# Patient Record
Sex: MALE | Race: ASIAN | Employment: FULL TIME | URBAN - METROPOLITAN AREA
[De-identification: names, ages, dates, MRNs, and addresses within clinical notes are randomized per-mention and may not be internally consistent; named-entity substitution may affect disease eponyms.]

---

## 2018-12-11 ENCOUNTER — PATIENT OUTREACH (OUTPATIENT)
Dept: PLASTIC SURGERY | Facility: CLINIC | Age: 33
End: 2018-12-11

## 2018-12-11 DIAGNOSIS — F64.0 GENDER DYSPHORIA IN ADULT: Primary | ICD-10-CM

## 2018-12-11 NOTE — PROGRESS NOTES
[facility]:  Care Coordination Note     SITUATION   Patient is a 33 year old who is receiving support for:  Clinic Care Coordination - Initial  .    BACKGROUND     Pt previous patient of Dr. Cuenca, current goes to [facility] for care. Pt wanting to know other PCP referrals within [place] to possibly switch care.     ASSESSMENT     Surgery              CGC Assessment  Comprehensive Gender Care (CGC) Enrollment: Potential(Pt calling to obtain PCP referrals within [place] (P not in network). )          PLAN       Nursing Interventions:   [facility] program and services discussed with patient. [place] provider Dr. Calderon,  Dr. Logan, and Ramona Fierro referrals provided to patient. [facility] referral placed.     Follow-up plan:  Patient will determine which providers are in network and reach out himself.        Cecilio Oneal

## 2020-03-02 ENCOUNTER — HEALTH MAINTENANCE LETTER (OUTPATIENT)
Age: 35
End: 2020-03-02

## 2020-12-20 ENCOUNTER — HEALTH MAINTENANCE LETTER (OUTPATIENT)
Age: 35
End: 2020-12-20

## 2021-04-24 ENCOUNTER — HEALTH MAINTENANCE LETTER (OUTPATIENT)
Age: 36
End: 2021-04-24

## 2021-10-03 ENCOUNTER — HEALTH MAINTENANCE LETTER (OUTPATIENT)
Age: 36
End: 2021-10-03

## 2021-10-12 ENCOUNTER — HOSPITAL ENCOUNTER (EMERGENCY)
Facility: HOSPITAL | Age: 36
Discharge: HOME | End: 2021-10-12
Attending: EMERGENCY MEDICINE | Admitting: EMERGENCY MEDICINE
Payer: COMMERCIAL

## 2021-10-12 ENCOUNTER — APPOINTMENT (OUTPATIENT)
Dept: RADIOLOGY | Facility: HOSPITAL | Age: 36
End: 2021-10-12
Attending: EMERGENCY MEDICINE
Payer: COMMERCIAL

## 2021-10-12 VITALS
HEIGHT: 60 IN | TEMPERATURE: 99.5 F | RESPIRATION RATE: 16 BRPM | HEART RATE: 85 BPM | OXYGEN SATURATION: 98 % | BODY MASS INDEX: 27.48 KG/M2 | SYSTOLIC BLOOD PRESSURE: 120 MMHG | DIASTOLIC BLOOD PRESSURE: 73 MMHG | WEIGHT: 140 LBS

## 2021-10-12 DIAGNOSIS — R42 LIGHTHEADEDNESS: ICD-10-CM

## 2021-10-12 DIAGNOSIS — R07.9 CHEST PAIN, UNSPECIFIED TYPE: ICD-10-CM

## 2021-10-12 LAB
ALBUMIN SERPL-MCNC: 4.1 G/DL (ref 3.5–5)
ALP SERPL-CCNC: 69 U/L (ref 45–120)
ALT SERPL W P-5'-P-CCNC: 28 U/L (ref 0–45)
ANION GAP SERPL CALCULATED.3IONS-SCNC: 11 MMOL/L (ref 5–18)
AST SERPL W P-5'-P-CCNC: 25 U/L (ref 0–40)
ATRIAL RATE - MUSE: 79 BPM
BASOPHILS # BLD AUTO: 0 10E3/UL (ref 0–0.2)
BASOPHILS NFR BLD AUTO: 1 %
BILIRUB SERPL-MCNC: 0.7 MG/DL (ref 0–1)
BNP SERPL-MCNC: <10 PG/ML (ref 0–35)
BUN SERPL-MCNC: 15 MG/DL (ref 8–22)
CALCIUM SERPL-MCNC: 9.9 MG/DL (ref 8.5–10.5)
CHLORIDE BLD-SCNC: 105 MMOL/L (ref 98–107)
CO2 SERPL-SCNC: 26 MMOL/L (ref 22–31)
CREAT SERPL-MCNC: 0.83 MG/DL (ref 0.7–1.3)
D DIMER PPP FEU-MCNC: 0.3 UG/ML FEU (ref 0–0.5)
DIASTOLIC BLOOD PRESSURE - MUSE: NORMAL MMHG
EOSINOPHIL # BLD AUTO: 0.1 10E3/UL (ref 0–0.7)
EOSINOPHIL NFR BLD AUTO: 2 %
ERYTHROCYTE [DISTWIDTH] IN BLOOD BY AUTOMATED COUNT: 12 % (ref 10–15)
GFR SERPL CREATININE-BSD FRML MDRD: >90 ML/MIN/1.73M2
GLUCOSE BLD-MCNC: 140 MG/DL (ref 70–125)
HCT VFR BLD AUTO: 45 % (ref 40–53)
HGB BLD-MCNC: 15.7 G/DL (ref 13.3–17.7)
HOLD SPECIMEN: NORMAL
IMM GRANULOCYTES # BLD: 0 10E3/UL
IMM GRANULOCYTES NFR BLD: 0 %
INTERPRETATION ECG - MUSE: NORMAL
LYMPHOCYTES # BLD AUTO: 1.1 10E3/UL (ref 0.8–5.3)
LYMPHOCYTES NFR BLD AUTO: 17 %
MAGNESIUM SERPL-MCNC: 1.9 MG/DL (ref 1.8–2.6)
MCH RBC QN AUTO: 33.4 PG (ref 26.5–33)
MCHC RBC AUTO-ENTMCNC: 34.9 G/DL (ref 31.5–36.5)
MCV RBC AUTO: 96 FL (ref 78–100)
MONOCYTES # BLD AUTO: 0.3 10E3/UL (ref 0–1.3)
MONOCYTES NFR BLD AUTO: 4 %
NEUTROPHILS # BLD AUTO: 5.1 10E3/UL (ref 1.6–8.3)
NEUTROPHILS NFR BLD AUTO: 76 %
NRBC # BLD AUTO: 0 10E3/UL
NRBC BLD AUTO-RTO: 0 /100
P AXIS - MUSE: 56 DEGREES
PLATELET # BLD AUTO: 185 10E3/UL (ref 150–450)
POTASSIUM BLD-SCNC: 3.5 MMOL/L (ref 3.5–5)
PR INTERVAL - MUSE: 130 MS
PROT SERPL-MCNC: 7.5 G/DL (ref 6–8)
QRS DURATION - MUSE: 102 MS
QT - MUSE: 372 MS
QTC - MUSE: 426 MS
R AXIS - MUSE: 6 DEGREES
RBC # BLD AUTO: 4.7 10E6/UL (ref 4.4–5.9)
SODIUM SERPL-SCNC: 142 MMOL/L (ref 136–145)
SYSTOLIC BLOOD PRESSURE - MUSE: NORMAL MMHG
T AXIS - MUSE: 53 DEGREES
TROPONIN I SERPL-MCNC: 0.02 NG/ML (ref 0–0.29)
TROPONIN I SERPL-MCNC: <0.01 NG/ML (ref 0–0.29)
VENTRICULAR RATE- MUSE: 79 BPM
WBC # BLD AUTO: 6.6 10E3/UL (ref 4–11)

## 2021-10-12 PROCEDURE — 71046 X-RAY EXAM CHEST 2 VIEWS: CPT

## 2021-10-12 PROCEDURE — 83880 ASSAY OF NATRIURETIC PEPTIDE: CPT | Performed by: EMERGENCY MEDICINE

## 2021-10-12 PROCEDURE — 85379 FIBRIN DEGRADATION QUANT: CPT | Performed by: EMERGENCY MEDICINE

## 2021-10-12 PROCEDURE — 36415 COLL VENOUS BLD VENIPUNCTURE: CPT | Performed by: EMERGENCY MEDICINE

## 2021-10-12 PROCEDURE — 99285 EMERGENCY DEPT VISIT HI MDM: CPT | Mod: 25

## 2021-10-12 PROCEDURE — 93005 ELECTROCARDIOGRAM TRACING: CPT | Performed by: STUDENT IN AN ORGANIZED HEALTH CARE EDUCATION/TRAINING PROGRAM

## 2021-10-12 PROCEDURE — 80053 COMPREHEN METABOLIC PANEL: CPT | Performed by: EMERGENCY MEDICINE

## 2021-10-12 PROCEDURE — 84484 ASSAY OF TROPONIN QUANT: CPT | Performed by: EMERGENCY MEDICINE

## 2021-10-12 PROCEDURE — 83735 ASSAY OF MAGNESIUM: CPT | Performed by: EMERGENCY MEDICINE

## 2021-10-12 PROCEDURE — 93005 ELECTROCARDIOGRAM TRACING: CPT | Performed by: EMERGENCY MEDICINE

## 2021-10-12 PROCEDURE — 85025 COMPLETE CBC W/AUTO DIFF WBC: CPT | Performed by: EMERGENCY MEDICINE

## 2021-10-12 ASSESSMENT — MIFFLIN-ST. JEOR: SCORE: 1417.54

## 2021-10-12 NOTE — ED PROVIDER NOTES
EMERGENCY DEPARTMENT ENCOUnter      NAME: Murphy Cannon  AGE: 36 year old male  YOB: 1985  MRN: [id number]  EVALUATION DATE & TIME: 10/12/2021  6:43 PM    PCP: [facility]    ED PROVIDER: Paras Bryant DO      Chief Complaint   Patient presents with     Chest Pain         FINAL IMPRESSION:  1. Chest pain, unspecified type    2. Lightheadedness          ED COURSE & MEDICAL DECISION MAKIN:46 PM I met with the patient to gather history and to perform my initial exam. We discussed plans for the ED course, including diagnostic testing and treatment.   9:37 PM I rechecked and updated the patient with results.    The patient presented emergency department today with complaints of lightheadedness and chest discomfort which began earlier this afternoon. He has no similar symptoms in the past. On exam he appears quite comfortable. Laboratory testing is unremarkable including troponin x2 and D-dimer. EKG is unremarkable as well. Chest x-ray is normal. Given the patient's well appearance and normal work-up tonight I feel that he can be safely discharged home. At this time I am not suspicious for any other serious intrathoracic process to explain his symptoms. He has been instructed to return if there are worsening symptoms or other concerns. He is comfortable with this plan.    At the conclusion of the encounter I discussed the results of all of the tests and the disposition. The questions were answered. The patient or family acknowledged understanding and was agreeable with the care plan.     PPE worn: surgical mask.      =================================================================    HPI        Murphy Cannon is a 36 year old male with a pertinent history of transgender on testosterone supplements who presents to this ED via private vehicle for evaluation of chest pain.    Patient developed chest heaviness after dinner at 1730 tonight. At first he attributed it to indigestion  and drank water although this sensation has persisted with progressive onset lightheadedness and minor vision changes. Currently, he still feels this sensation of tightness/heaviness on his chest. His lightheadedness has decreased. No shortness of breath but states he is taking deep breaths to counteract the lightheadedness. Denies any radiating pain, arm pain, or leg pain. He has never experienced pain like this before. States he felt fine earlier today. Patient identifies as transgender and is on testosterone supplements but is otherwise healthy and does not take any other daily medications. No other complaints or concerns expressed at this time.      REVIEW OF SYSTEMS     Constitutional:  Denies fever or chills  HENT:  Denies sore throat   Respiratory:  Denies cough or shortness of breath. Positive for chest tightness.  Cardiovascular:  Denies palpitations. Positive for chest pain.   GI:  Denies abdominal pain, nausea, or vomiting  Musculoskeletal:  Denies any new extremity pain   Skin:  Denies rash   Neurologic:  Denies headache, focal weakness. Positive for lightheadedness.   All other systems reviewed and are negative      PAST MEDICAL HISTORY:  Past Medical History:   Diagnosis Date     Eczema      Gender dysphoria        PAST SURGICAL HISTORY:  Past Surgical History:   Procedure Laterality Date     MASTECTOMY SUBCUTANEOUS BILATERAL Bilateral 8/31/2015    Procedure: MASTECTOMY SUBCUTANEOUS BILATERAL;  Surgeon: Angela Cuenca MD;  Location: [facility] OR     TRANSGENDER MASTECTOMY Bilateral 7/18/2016    Procedure: TRANSGENDER MASTECTOMY;  Surgeon: Angela Cuenca MD;  Location: [facility] OR           CURRENT MEDICATIONS:    lidocaine-prilocaine (EMLA) cream  TESTOSTERONE IM        ALLERGIES:  No Known Allergies    FAMILY HISTORY:  No family history on file.    SOCIAL HISTORY:   Social History     Socioeconomic History     Marital status:      Spouse name: Not on file     Number of children: Not on file      Years of education: Not on file     Highest education level: Not on file   Occupational History     Not on file   Tobacco Use     Smoking status: Former Smoker     Packs/day: 0.10     Quit date: 2015     Years since quittin.1   Substance and Sexual Activity     Alcohol use: Yes     Comment: social     Drug use: No     Sexual activity: Not on file   Other Topics Concern     Not on file   Social History Narrative     Not on file     Social Determinants of Health     Financial Resource Strain:      Difficulty of Paying Living Expenses:    Food Insecurity:      Worried About Running Out of Food in the Last Year:      Ran Out of Food in the Last Year:    Transportation Needs:      Lack of Transportation (Medical):      Lack of Transportation (Non-Medical):    Physical Activity:      Days of Exercise per Week:      Minutes of Exercise per Session:    Stress:      Feeling of Stress :    Social Connections:      Frequency of Communication with Friends and Family:      Frequency of Social Gatherings with Friends and Family:      Attends Restoration[employer] Services:      Active Member of Clubs or Organizations:      Attends Club or Organization Meetings:      Marital Status:    Intimate Partner Violence:      Fear of Current or Ex-Partner:      Emotionally Abused:      Physically Abused:      Sexually Abused:        VITALS:  Patient Vitals for the past 24 hrs:   BP Temp Pulse Resp SpO2 Height Weight   10/12/21 2100 120/73 -- 85 16 98 % -- --   10/12/21 2000 -- -- 82 18 97 % -- --   10/12/21 1900 123/83 -- 85 15 97 % -- --   10/12/21 1823 (!) 140/88 99.5  F (37.5  C) 93 16 100 % 1.524 m (5') 63.5 kg (140 lb)       PHYSICAL EXAM    Constitutional:  Well developed, Well nourished,  HENT:  Normocephalic, Atraumatic, Bilateral external ears normal, Oropharynx moist, Nose normal.   Neck:  Normal range of motion, No meningismus, No stridor.   Eyes:  EOMI, Conjunctiva normal, No discharge.   Respiratory:  Normal breath sounds, No  respiratory distress, No wheezing, No chest tenderness.   Cardiovascular:  Normal heart rate, Normal rhythm, No murmurs  GI:  Soft, No tenderness, No guarding, No CVA tenderness.   Musculoskeletal:  Neurovascularly intact distally, No edema, No tenderness, No cyanosis, Good range of motion in all major joints. No tenderness to palpation or major deformities noted.   Integument:  Warm, Dry, No erythema, No rash.   Lymphatic:  No lymphadenopathy noted.   Neurologic:  Alert & oriented x 3, Normal motor function, Normal sensory function, No focal deficits noted.   Psychiatric:  Affect normal, Judgment normal, Mood normal.      LAB:  All pertinent labs reviewed and interpreted.  Results for orders placed or performed during the hospital encounter of 10/12/21              Result Value Ref Range    Troponin I 0.02 0.00 - 0.29 ng/mL   Result Value Ref Range    Magnesium 1.9 1.8 - 2.6 mg/dL   Comprehensive metabolic panel   Result Value Ref Range    Sodium 142 136 - 145 mmol/L    Potassium 3.5 3.5 - 5.0 mmol/L    Chloride 105 98 - 107 mmol/L    Carbon Dioxide (CO2) 26 22 - 31 mmol/L    Anion Gap 11 5 - 18 mmol/L    Urea Nitrogen 15 8 - 22 mg/dL    Creatinine 0.83 0.70 - 1.30 mg/dL    Calcium 9.9 8.5 - 10.5 mg/dL    Glucose 140 (H) 70 - 125 mg/dL    Alkaline Phosphatase 69 45 - 120 U/L    AST 25 0 - 40 U/L    ALT 28 0 - 45 U/L    Protein Total 7.5 6.0 - 8.0 g/dL    Albumin 4.1 3.5 - 5.0 g/dL    Bilirubin Total 0.7 0.0 - 1.0 mg/dL    GFR Estimate >90 >60 mL/min/1.73m2   B-Type Natriuretic Peptide ([facility] Only)   Result Value Ref Range    BNP <10 0 - 35 pg/mL   CBC with platelets and differential   Result Value Ref Range    WBC Count 6.6 4.0 - 11.0 10e3/uL    RBC Count 4.70 4.40 - 5.90 10e6/uL    Hemoglobin 15.7 13.3 - 17.7 g/dL    Hematocrit 45.0 40.0 - 53.0 %    MCV 96 78 - 100 fL    MCH 33.4 (H) 26.5 - 33.0 pg    MCHC 34.9 31.5 - 36.5 g/dL    RDW 12.0 10.0 - 15.0 %    Platelet Count 185 150 - 450 10e3/uL    % Neutrophils 76  %    % Lymphocytes 17 %    % Monocytes 4 %    % Eosinophils 2 %    % Basophils 1 %    % Immature Granulocytes 0 %    NRBCs per 100 WBC 0 <1 /100    Absolute Neutrophils 5.1 1.6 - 8.3 10e3/uL    Absolute Lymphocytes 1.1 0.8 - 5.3 10e3/uL    Absolute Monocytes 0.3 0.0 - 1.3 10e3/uL    Absolute Eosinophils 0.1 0.0 - 0.7 10e3/uL    Absolute Basophils 0.0 0.0 - 0.2 10e3/uL    Absolute Immature Granulocytes 0.0 <=0.0 10e3/uL    Absolute NRBCs 0.0 10e3/uL   Extra Green Top (Lithium Heparin) Tube   Result Value Ref Range    Hold Specimen JIC    D dimer quantitative   Result Value Ref Range    D-Dimer Quantitative 0.30 0.00 - 0.50 ug/mL FEU   Troponin I (second draw)   Result Value Ref Range    Troponin I <0.01 0.00 - 0.29 ng/mL         RADIOLOGY:  I have independently reviewed and interpreted the above imaging, pending the final radiology read.  XR Chest 2 Views   Final Result   IMPRESSION: Negative chest.          EKG:    Normal sinus rhythm at 79 bpm. Normal axis. No signs of acute ischemia.  ms,  ms.    I have independently reviewed and interpreted this EKG          I, Julian Gardner, am serving as a scribe to document services personally performed by Dr. Bryant based on my observation and the provider's statements to me. I, Paras Bryant, DO attest that Julian Gardner is acting in a scribe capacity, has observed my performance of the services and has documented them in accordance with my direction.    Paras Bryant, DO  Emergency Medicine  [facility] EMERGENCY DEPARTMENT  [address] [address]  [phone]  Dept: [phone]     Paras Bryant MD  10/12/21 1099

## 2021-10-12 NOTE — ED TRIAGE NOTES
Pt with c/o feeling lightheaded, sob and chest pressure.  Pt initially states felt like a lump in mid chest but then states pressure.  Onset about an hour ago while eating.  Pt denies any /v.

## 2021-10-13 NOTE — DISCHARGE INSTRUCTIONS
Fortunately all of your testing tonight has been normal.  Follow-up closely with your primary care doctor as an outpatient and return to the ER if you have any worsening symptoms or other concerns.

## 2022-05-15 ENCOUNTER — HEALTH MAINTENANCE LETTER (OUTPATIENT)
Age: 37
End: 2022-05-15

## 2022-09-10 ENCOUNTER — HEALTH MAINTENANCE LETTER (OUTPATIENT)
Age: 37
End: 2022-09-10

## 2023-06-03 ENCOUNTER — HEALTH MAINTENANCE LETTER (OUTPATIENT)
Age: 38
End: 2023-06-03

## 2024-07-07 ENCOUNTER — HEALTH MAINTENANCE LETTER (OUTPATIENT)
Age: 39
End: 2024-07-07

## 2024-10-09 ENCOUNTER — OFFICE VISIT (OUTPATIENT)
Dept: FAMILY MEDICINE | Facility: CLINIC | Age: 39
End: 2024-10-09
Payer: COMMERCIAL

## 2024-10-09 VITALS
SYSTOLIC BLOOD PRESSURE: 119 MMHG | DIASTOLIC BLOOD PRESSURE: 81 MMHG | TEMPERATURE: 98.2 F | RESPIRATION RATE: 18 BRPM | HEART RATE: 82 BPM | OXYGEN SATURATION: 98 %

## 2024-10-09 DIAGNOSIS — T16.1XXA FOREIGN BODY OF RIGHT EAR, INITIAL ENCOUNTER: ICD-10-CM

## 2024-10-09 DIAGNOSIS — H93.8X1 IRRITATION OF RIGHT EAR: Primary | ICD-10-CM

## 2024-10-09 PROCEDURE — 69200 CLEAR OUTER EAR CANAL: CPT | Mod: RT | Performed by: PHYSICIAN ASSISTANT

## 2024-10-09 RX ORDER — NEOMYCIN SULFATE, POLYMYXIN B SULFATE AND HYDROCORTISONE 10; 3.5; 1 MG/ML; MG/ML; [USP'U]/ML
4 SUSPENSION/ DROPS AURICULAR (OTIC) 4 TIMES DAILY
Qty: 10 ML | Refills: 0 | Status: SHIPPED | OUTPATIENT
Start: 2024-10-09 | End: 2024-10-14

## 2024-10-09 RX ORDER — ALBUTEROL SULFATE 90 UG/1
INHALANT RESPIRATORY (INHALATION)
COMMUNITY
Start: 2023-12-08

## 2024-10-09 RX ORDER — TRIAMCINOLONE ACETONIDE 1 MG/ML
LOTION TOPICAL
COMMUNITY
Start: 2024-03-09

## 2024-10-09 NOTE — PATIENT INSTRUCTIONS
Cortisporin attic drops to the right ear as directed  Use over-the-counter Tylenol for comfort    Follow-up with primary care provider if not getting good resolution of the symptoms or concerns arise.

## 2024-10-09 NOTE — PROGRESS NOTES
Patient presents with:  rt ear infection: Pt states hair stuck in ear and tried getting it out yesterday- did ear drops but no luck. Pt states rt face, throat and ears are in pain  No fevers      Clinical Decision Making:  Ear lavage was performed to remove the hair from the external auditory canal since I was unable to grab it with direct visualization with curette.  Additionally, patient will use Cortisporin attic suspension to help with irritation on the external auditory canal use over-the-counter Tylenol for comfort. Expected course of resolution and indication for return was gone over and questions were answered to patient/parent's satisfaction before discharge.      ICD-10-CM    1. Irritation of right ear  H93.8X1 neomycin-polymyxin-hydrocortisone (CORTISPORIN) 3.5-85033-9 otic suspension     REMOVE FB, EXT AUDITORY CANAL      2. Foreign body of right ear, initial encounter  T16.1XXA neomycin-polymyxin-hydrocortisone (CORTISPORIN) 3.5-52894-8 otic suspension     REMOVE FB, EXT AUDITORY CANAL          Patient Instructions     Cortisporin attic drops to the right ear as directed  Use over-the-counter Tylenol for comfort    Follow-up with primary care provider if not getting good resolution of the symptoms or concerns arise.    HPI:  Murphy Cannon is a 39 year old male who presents today for evaluation of right ear irritation.  Patient states that he had his wife look into the ear and they found that there was a hair in the ear that was irritating the external auditory canal.  The wife did have an otoscope at home was able to see the hair in the ear and was able to remove the wax with a wax removal kit with curette additionally they tried the hair but were unsuccessful.  Patient tried using a shop vac at home to help vacuum right external ear.  Since they are unsuccessful now presenting to clinic for evaluation and treatment.     History obtained from chart review and the patient.    Problem List:  2015-02: Gender  dysphoria in adolescent and adult      Past Medical History:   Diagnosis Date    Eczema     Gender dysphoria        Social History     Tobacco Use    Smoking status: Former     Current packs/day: 0.00     Types: Cigarettes     Quit date: 2015     Years since quittin.1    Smokeless tobacco: Not on file   Substance Use Topics    Alcohol use: Yes     Comment: social       Review of Systems  As above in HPI otherwise negative.    Vitals:    10/09/24 1352   BP: 119/81   BP Location: Right arm   Patient Position: Sitting   Cuff Size: Adult Regular   Pulse: 82   Resp: 18   Temp: 98.2  F (36.8  C)   TempSrc: Oral   SpO2: 98%       General: Patient is resting comfortably no acute distress is afebrile  HEENT: Head is normocephalic atraumatic   eyes are PERRL EOMI sclera anicteric   TMs and external auditory canal on the left is clear.  TM on the right is visualized however in the external auditory canal there is a long black hair that is noted.  Using lavage the hair was removed from the right ear.  The external auditory canal was erythematous and irritated but the skin was not broken down and there was no bleeding.  Throat is with mild pharyngeal wall erythema and no exudate  No cervical lymphadenopathy present      Physical Exam    At the end of the encounter, I discussed results, diagnosis, medications. Discussed red flags for immediate return to clinic/ER, as well as indications for follow up if no improvement. Patient understood and agreed to plan. Patient was stable for discharge.

## 2024-11-24 ENCOUNTER — HEALTH MAINTENANCE LETTER (OUTPATIENT)
Age: 39
End: 2024-11-24